# Patient Record
Sex: MALE
[De-identification: names, ages, dates, MRNs, and addresses within clinical notes are randomized per-mention and may not be internally consistent; named-entity substitution may affect disease eponyms.]

---

## 2018-04-06 ENCOUNTER — HOSPITAL ENCOUNTER (EMERGENCY)
Dept: HOSPITAL 14 - H.ER | Age: 48
LOS: 1 days | Discharge: HOME | End: 2018-04-07
Payer: COMMERCIAL

## 2018-04-06 VITALS
SYSTOLIC BLOOD PRESSURE: 115 MMHG | HEART RATE: 80 BPM | TEMPERATURE: 97.7 F | DIASTOLIC BLOOD PRESSURE: 69 MMHG | RESPIRATION RATE: 18 BRPM | OXYGEN SATURATION: 98 %
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
## 2018-04-06 DIAGNOSIS — S89.92XA: Primary | ICD-10-CM

## 2018-04-07 NOTE — ED PDOC
Lower Extremity Pain/Injury


Time Seen by Provider: 04/06/18 23:10


Chief Complaint (Nursing): Lower Extremity Problem/Injury


Chief Complaint (Provider): Left calf pain, ripping sensation


History Per: Patient


History/Exam Limitations: no limitations


Onset/Duration Of Symptoms: Days


Current Symptoms Are (Timing): Still Present


Severity: Moderate


Pain Scale Rating Of: 5


Additional Complaint(s): 





46 yo male with no medical problems presents with left calf pain. PT states he 

was playing volleyball. Pt states he felt a rip/pop sensation in the left calf. 

Pt did not take medications for pain. Pt cannot weight bare. 





Past Medical History


Reviewed: Historical Data, Nursing Documentation, Vital Signs


Vital Signs: 





 Last Vital Signs











Temp  97.7 F   04/06/18 22:25


 


Pulse  80   04/06/18 22:25


 


Resp  18   04/06/18 22:25


 


BP  115/69   04/06/18 22:25


 


Pulse Ox  98   04/06/18 22:25














- Medical History


PMH: No Chronic Diseases





- Surgical History


Surgical History: No Surg Hx





- Family History


Family History: States: No Known Family Hx





- Living Arrangements


Living Arrangements: With Family





- Social History


Current smoker - smoking cessation education provided: No


Alcohol: None


Drugs: Denies





- Allergies


Allergies/Adverse Reactions: 


 Allergies











Allergy/AdvReac Type Severity Reaction Status Date / Time


 


No Known Allergies Allergy   Verified 04/06/18 22:25














Physical Exam





- Reviewed


Nursing Documentation Reviewed: Yes


Vital Signs Reviewed: Yes





- Physical Exam


Appears: Positive for: Well, Non-toxic, No Acute Distress


Head Exam: Positive for: ATRAUMATIC, NORMAL INSPECTION, NORMOCEPHALIC


Skin: Positive for: Normal Color (No ecchymosis, no abrasions ), Warm


Eye Exam: Positive for: Normal appearance


ENT: Positive for: Normal ENT Inspection


Neck: Positive for: Normal, Painless ROM


Respiratory: Negative for: Accessory Muscle Use, Respiratory Distress


Back: Positive for: Normal Inspection


Extremity: Positive for: Normal ROM, Tenderness (Left calf ), Calf Tenderness, 

Other ((+) almonte on the left; achilles intact, firm and palpable without 

tenderness ).  Negative for: Deformity, Swelling


Neurologic/Psych: Positive for: Alert, Oriented





- ECG


O2 Sat by Pulse Oximetry: 98





Medical Decision Making


Medical Decision Making: 





Posterior splint placed with foot in plantar flexion.





Disposition





- Clinical Impression


Clinical Impression: 


 Injury of calf








- Patient ED Disposition


Is Patient to be Admitted: No


Counseled Patient/Family Regarding: Diagnosis, Need For Followup





- Disposition


Referrals: 


 Service [Outside]


Disposition: Routine/Home


Disposition Time: 00:04


Condition: GOOD


Additional Instructions: 


Please follow-up with orthopedics. 


Instructions:  Muscle Strain


Forms:  [employer] (English)